# Patient Record
Sex: MALE | Race: WHITE
[De-identification: names, ages, dates, MRNs, and addresses within clinical notes are randomized per-mention and may not be internally consistent; named-entity substitution may affect disease eponyms.]

---

## 2018-05-14 ENCOUNTER — TELEPHONE (OUTPATIENT)
Dept: CASE MANAGEMENT | Age: 53
End: 2018-05-14

## 2018-05-14 ENCOUNTER — HOSPITAL ENCOUNTER (OUTPATIENT)
Dept: VASCULAR LAB | Age: 53
Discharge: HOME | End: 2018-05-14
Attending: NURSE PRACTITIONER | Admitting: NURSE PRACTITIONER

## 2018-05-14 DIAGNOSIS — I83.90 ASYMPTOMATIC VARICOSE VEINS OF LOWER EXTREMITY: ICD-10-CM

## 2023-01-31 ENCOUNTER — ANESTHESIA EVENT (OUTPATIENT)
Dept: OPERATING ROOM | Age: 58
End: 2023-01-31

## 2023-01-31 ENCOUNTER — HOSPITAL ENCOUNTER (OUTPATIENT)
Age: 58
Discharge: HOME | End: 2023-02-01
Attending: EMERGENCY MEDICINE | Admitting: SURGERY

## 2023-01-31 ENCOUNTER — ANESTHESIA (OUTPATIENT)
Dept: OPERATING ROOM | Age: 58
End: 2023-01-31

## 2023-01-31 ENCOUNTER — APPOINTMENT (OUTPATIENT)
Dept: CT IMAGING | Age: 58
End: 2023-01-31

## 2023-01-31 DIAGNOSIS — K80.20 GALL STONES: ICD-10-CM

## 2023-01-31 DIAGNOSIS — K35.80 ACUTE APPENDICITIS, UNSPECIFIED ACUTE APPENDICITIS TYPE: ICD-10-CM

## 2023-01-31 DIAGNOSIS — K57.90 DIVERTICULOSIS: ICD-10-CM

## 2023-01-31 DIAGNOSIS — K35.890 OTHER ACUTE APPENDICITIS: Primary | ICD-10-CM

## 2023-01-31 DIAGNOSIS — R03.0 ELEVATED BLOOD PRESSURE READING: ICD-10-CM

## 2023-01-31 PROBLEM — K35.30 ACUTE APPENDICITIS WITH LOCALIZED PERITONITIS, WITHOUT PERFORATION, ABSCESS, OR GANGRENE: Status: ACTIVE | Noted: 2023-01-31

## 2023-01-31 LAB
A/G RATIO: 1.5 (ref 1.1–2.2)
ALBUMIN SERPL-MCNC: 4.2 G/DL (ref 3.4–5)
ALP BLD-CCNC: 109 U/L (ref 40–129)
ALT SERPL-CCNC: 19 U/L (ref 10–40)
ANION GAP SERPL CALCULATED.3IONS-SCNC: 11 MMOL/L (ref 3–16)
AST SERPL-CCNC: 22 U/L (ref 15–37)
BASOPHILS ABSOLUTE: 0.1 K/UL (ref 0–0.2)
BASOPHILS RELATIVE PERCENT: 0.8 %
BILIRUB SERPL-MCNC: 1 MG/DL (ref 0–1)
BILIRUBIN URINE: NEGATIVE
BLOOD, URINE: NEGATIVE
BUN BLDV-MCNC: 12 MG/DL (ref 7–20)
CALCIUM SERPL-MCNC: 8.5 MG/DL (ref 8.3–10.6)
CHLORIDE BLD-SCNC: 100 MMOL/L (ref 99–110)
CLARITY: CLEAR
CO2: 27 MMOL/L (ref 21–32)
COLOR: YELLOW
CREAT SERPL-MCNC: 0.9 MG/DL (ref 0.9–1.3)
EOSINOPHILS ABSOLUTE: 0.2 K/UL (ref 0–0.6)
EOSINOPHILS RELATIVE PERCENT: 1.8 %
GFR SERPL CREATININE-BSD FRML MDRD: >60 ML/MIN/{1.73_M2}
GLUCOSE BLD-MCNC: 107 MG/DL (ref 70–99)
GLUCOSE URINE: NEGATIVE MG/DL
HCT VFR BLD CALC: 46.5 % (ref 40.5–52.5)
HEMOGLOBIN: 15.9 G/DL (ref 13.5–17.5)
INFLUENZA A: NOT DETECTED
INFLUENZA B: NOT DETECTED
KETONES, URINE: NEGATIVE MG/DL
LEUKOCYTE ESTERASE, URINE: NEGATIVE
LYMPHOCYTES ABSOLUTE: 2 K/UL (ref 1–5.1)
LYMPHOCYTES RELATIVE PERCENT: 19.7 %
MCH RBC QN AUTO: 33.3 PG (ref 26–34)
MCHC RBC AUTO-ENTMCNC: 34.2 G/DL (ref 31–36)
MCV RBC AUTO: 97.6 FL (ref 80–100)
MICROSCOPIC EXAMINATION: NORMAL
MONOCYTES ABSOLUTE: 1.1 K/UL (ref 0–1.3)
MONOCYTES RELATIVE PERCENT: 11 %
NEUTROPHILS ABSOLUTE: 6.8 K/UL (ref 1.7–7.7)
NEUTROPHILS RELATIVE PERCENT: 66.7 %
NITRITE, URINE: NEGATIVE
PDW BLD-RTO: 13.7 % (ref 12.4–15.4)
PH UA: 6 (ref 5–8)
PLATELET # BLD: 160 K/UL (ref 135–450)
PMV BLD AUTO: 9.3 FL (ref 5–10.5)
POTASSIUM REFLEX MAGNESIUM: 3.7 MMOL/L (ref 3.5–5.1)
PROTEIN UA: NEGATIVE MG/DL
RBC # BLD: 4.77 M/UL (ref 4.2–5.9)
SARS-COV-2 RNA, RT PCR: NOT DETECTED
SODIUM BLD-SCNC: 138 MMOL/L (ref 136–145)
SPECIFIC GRAVITY UA: 1.01 (ref 1–1.03)
TOTAL PROTEIN: 7 G/DL (ref 6.4–8.2)
URINE REFLEX TO CULTURE: NORMAL
URINE TYPE: NORMAL
UROBILINOGEN, URINE: 0.2 E.U./DL
WBC # BLD: 10.3 K/UL (ref 4–11)

## 2023-01-31 PROCEDURE — 87636 SARSCOV2 & INF A&B AMP PRB: CPT

## 2023-01-31 PROCEDURE — 3700000001 HC ADD 15 MINUTES (ANESTHESIA): Performed by: SURGERY

## 2023-01-31 PROCEDURE — 2709999900 HC NON-CHARGEABLE SUPPLY: Performed by: SURGERY

## 2023-01-31 PROCEDURE — 88304 TISSUE EXAM BY PATHOLOGIST: CPT

## 2023-01-31 PROCEDURE — 2500000003 HC RX 250 WO HCPCS: Performed by: SURGERY

## 2023-01-31 PROCEDURE — 7100000001 HC PACU RECOVERY - ADDTL 15 MIN: Performed by: SURGERY

## 2023-01-31 PROCEDURE — 6360000004 HC RX CONTRAST MEDICATION: Performed by: EMERGENCY MEDICINE

## 2023-01-31 PROCEDURE — 85025 COMPLETE CBC W/AUTO DIFF WBC: CPT

## 2023-01-31 PROCEDURE — 2580000003 HC RX 258: Performed by: EMERGENCY MEDICINE

## 2023-01-31 PROCEDURE — 6360000002 HC RX W HCPCS: Performed by: EMERGENCY MEDICINE

## 2023-01-31 PROCEDURE — 6360000002 HC RX W HCPCS: Performed by: ANESTHESIOLOGY

## 2023-01-31 PROCEDURE — 44970 LAPAROSCOPY APPENDECTOMY: CPT | Performed by: SURGERY

## 2023-01-31 PROCEDURE — 7100000000 HC PACU RECOVERY - FIRST 15 MIN: Performed by: SURGERY

## 2023-01-31 PROCEDURE — 3600000014 HC SURGERY LEVEL 4 ADDTL 15MIN: Performed by: SURGERY

## 2023-01-31 PROCEDURE — 80053 COMPREHEN METABOLIC PANEL: CPT

## 2023-01-31 PROCEDURE — 2580000003 HC RX 258: Performed by: ANESTHESIOLOGY

## 2023-01-31 PROCEDURE — 81003 URINALYSIS AUTO W/O SCOPE: CPT

## 2023-01-31 PROCEDURE — 2720000010 HC SURG SUPPLY STERILE: Performed by: SURGERY

## 2023-01-31 PROCEDURE — 36415 COLL VENOUS BLD VENIPUNCTURE: CPT

## 2023-01-31 PROCEDURE — 99285 EMERGENCY DEPT VISIT HI MDM: CPT

## 2023-01-31 PROCEDURE — 3700000000 HC ANESTHESIA ATTENDED CARE: Performed by: SURGERY

## 2023-01-31 PROCEDURE — 74177 CT ABD & PELVIS W/CONTRAST: CPT

## 2023-01-31 PROCEDURE — 2500000003 HC RX 250 WO HCPCS: Performed by: ANESTHESIOLOGY

## 2023-01-31 PROCEDURE — 96365 THER/PROPH/DIAG IV INF INIT: CPT

## 2023-01-31 PROCEDURE — 3600000004 HC SURGERY LEVEL 4 BASE: Performed by: SURGERY

## 2023-01-31 PROCEDURE — 94150 VITAL CAPACITY TEST: CPT

## 2023-01-31 RX ORDER — FENTANYL CITRATE 50 UG/ML
INJECTION, SOLUTION INTRAMUSCULAR; INTRAVENOUS PRN
Status: DISCONTINUED | OUTPATIENT
Start: 2023-01-31 | End: 2023-01-31 | Stop reason: SDUPTHER

## 2023-01-31 RX ORDER — SODIUM CHLORIDE 0.9 % (FLUSH) 0.9 %
5-40 SYRINGE (ML) INJECTION EVERY 12 HOURS SCHEDULED
Status: DISCONTINUED | OUTPATIENT
Start: 2023-01-31 | End: 2023-02-01 | Stop reason: HOSPADM

## 2023-01-31 RX ORDER — DIPHENHYDRAMINE HYDROCHLORIDE 50 MG/ML
12.5 INJECTION INTRAMUSCULAR; INTRAVENOUS
Status: DISCONTINUED | OUTPATIENT
Start: 2023-01-31 | End: 2023-01-31 | Stop reason: HOSPADM

## 2023-01-31 RX ORDER — SODIUM CHLORIDE 9 MG/ML
INJECTION, SOLUTION INTRAVENOUS PRN
Status: DISCONTINUED | OUTPATIENT
Start: 2023-01-31 | End: 2023-02-01 | Stop reason: HOSPADM

## 2023-01-31 RX ORDER — LABETALOL HYDROCHLORIDE 5 MG/ML
5 INJECTION, SOLUTION INTRAVENOUS EVERY 10 MIN PRN
Status: DISCONTINUED | OUTPATIENT
Start: 2023-01-31 | End: 2023-01-31 | Stop reason: HOSPADM

## 2023-01-31 RX ORDER — SODIUM CHLORIDE 0.9 % (FLUSH) 0.9 %
5-40 SYRINGE (ML) INJECTION PRN
Status: DISCONTINUED | OUTPATIENT
Start: 2023-01-31 | End: 2023-02-01 | Stop reason: HOSPADM

## 2023-01-31 RX ORDER — OXYCODONE HYDROCHLORIDE 5 MG/1
10 TABLET ORAL PRN
Status: DISCONTINUED | OUTPATIENT
Start: 2023-01-31 | End: 2023-01-31 | Stop reason: HOSPADM

## 2023-01-31 RX ORDER — ATENOLOL 50 MG/1
50 TABLET ORAL DAILY
Status: DISCONTINUED | OUTPATIENT
Start: 2023-02-01 | End: 2023-02-01 | Stop reason: HOSPADM

## 2023-01-31 RX ORDER — ONDANSETRON 2 MG/ML
4 INJECTION INTRAMUSCULAR; INTRAVENOUS EVERY 6 HOURS PRN
Status: DISCONTINUED | OUTPATIENT
Start: 2023-01-31 | End: 2023-02-01 | Stop reason: HOSPADM

## 2023-01-31 RX ORDER — ONDANSETRON 2 MG/ML
INJECTION INTRAMUSCULAR; INTRAVENOUS PRN
Status: DISCONTINUED | OUTPATIENT
Start: 2023-01-31 | End: 2023-01-31 | Stop reason: SDUPTHER

## 2023-01-31 RX ORDER — BUPIVACAINE HYDROCHLORIDE 5 MG/ML
INJECTION, SOLUTION EPIDURAL; INTRACAUDAL PRN
Status: DISCONTINUED | OUTPATIENT
Start: 2023-01-31 | End: 2023-01-31 | Stop reason: ALTCHOICE

## 2023-01-31 RX ORDER — OXYCODONE HYDROCHLORIDE 5 MG/1
5 TABLET ORAL PRN
Status: DISCONTINUED | OUTPATIENT
Start: 2023-01-31 | End: 2023-01-31 | Stop reason: HOSPADM

## 2023-01-31 RX ORDER — ONDANSETRON 2 MG/ML
4 INJECTION INTRAMUSCULAR; INTRAVENOUS
Status: DISCONTINUED | OUTPATIENT
Start: 2023-01-31 | End: 2023-01-31 | Stop reason: HOSPADM

## 2023-01-31 RX ORDER — HYDRALAZINE HYDROCHLORIDE 20 MG/ML
INJECTION INTRAMUSCULAR; INTRAVENOUS PRN
Status: DISCONTINUED | OUTPATIENT
Start: 2023-01-31 | End: 2023-01-31 | Stop reason: SDUPTHER

## 2023-01-31 RX ORDER — SODIUM CHLORIDE 9 MG/ML
INJECTION, SOLUTION INTRAVENOUS PRN
Status: DISCONTINUED | OUTPATIENT
Start: 2023-01-31 | End: 2023-01-31 | Stop reason: HOSPADM

## 2023-01-31 RX ORDER — SODIUM CHLORIDE, SODIUM LACTATE, POTASSIUM CHLORIDE, CALCIUM CHLORIDE 600; 310; 30; 20 MG/100ML; MG/100ML; MG/100ML; MG/100ML
INJECTION, SOLUTION INTRAVENOUS CONTINUOUS PRN
Status: DISCONTINUED | OUTPATIENT
Start: 2023-01-31 | End: 2023-01-31 | Stop reason: SDUPTHER

## 2023-01-31 RX ORDER — SUCCINYLCHOLINE CHLORIDE 20 MG/ML
INJECTION INTRAMUSCULAR; INTRAVENOUS PRN
Status: DISCONTINUED | OUTPATIENT
Start: 2023-01-31 | End: 2023-01-31 | Stop reason: SDUPTHER

## 2023-01-31 RX ORDER — MORPHINE SULFATE 4 MG/ML
4 INJECTION, SOLUTION INTRAMUSCULAR; INTRAVENOUS
Status: DISCONTINUED | OUTPATIENT
Start: 2023-01-31 | End: 2023-02-01 | Stop reason: HOSPADM

## 2023-01-31 RX ORDER — MORPHINE SULFATE 2 MG/ML
2 INJECTION, SOLUTION INTRAMUSCULAR; INTRAVENOUS
Status: DISCONTINUED | OUTPATIENT
Start: 2023-01-31 | End: 2023-02-01 | Stop reason: HOSPADM

## 2023-01-31 RX ORDER — OXYCODONE HYDROCHLORIDE 5 MG/1
10 TABLET ORAL EVERY 4 HOURS PRN
Status: DISCONTINUED | OUTPATIENT
Start: 2023-01-31 | End: 2023-02-01 | Stop reason: HOSPADM

## 2023-01-31 RX ORDER — ROCURONIUM BROMIDE 10 MG/ML
INJECTION, SOLUTION INTRAVENOUS PRN
Status: DISCONTINUED | OUTPATIENT
Start: 2023-01-31 | End: 2023-01-31 | Stop reason: SDUPTHER

## 2023-01-31 RX ORDER — OXYCODONE HYDROCHLORIDE 5 MG/1
5 TABLET ORAL EVERY 4 HOURS PRN
Status: DISCONTINUED | OUTPATIENT
Start: 2023-01-31 | End: 2023-02-01 | Stop reason: HOSPADM

## 2023-01-31 RX ORDER — ONDANSETRON 4 MG/1
4 TABLET, ORALLY DISINTEGRATING ORAL EVERY 8 HOURS PRN
Status: DISCONTINUED | OUTPATIENT
Start: 2023-01-31 | End: 2023-02-01 | Stop reason: HOSPADM

## 2023-01-31 RX ORDER — ACETAMINOPHEN 325 MG/1
650 TABLET ORAL EVERY 4 HOURS PRN
Status: DISCONTINUED | OUTPATIENT
Start: 2023-01-31 | End: 2023-02-01 | Stop reason: HOSPADM

## 2023-01-31 RX ORDER — SODIUM CHLORIDE 0.9 % (FLUSH) 0.9 %
5-40 SYRINGE (ML) INJECTION PRN
Status: DISCONTINUED | OUTPATIENT
Start: 2023-01-31 | End: 2023-01-31 | Stop reason: HOSPADM

## 2023-01-31 RX ORDER — MEPERIDINE HYDROCHLORIDE 25 MG/ML
12.5 INJECTION INTRAMUSCULAR; INTRAVENOUS; SUBCUTANEOUS EVERY 5 MIN PRN
Status: DISCONTINUED | OUTPATIENT
Start: 2023-01-31 | End: 2023-01-31 | Stop reason: HOSPADM

## 2023-01-31 RX ORDER — DEXAMETHASONE SODIUM PHOSPHATE 4 MG/ML
INJECTION, SOLUTION INTRA-ARTICULAR; INTRALESIONAL; INTRAMUSCULAR; INTRAVENOUS; SOFT TISSUE PRN
Status: DISCONTINUED | OUTPATIENT
Start: 2023-01-31 | End: 2023-01-31 | Stop reason: SDUPTHER

## 2023-01-31 RX ORDER — SODIUM CHLORIDE 0.9 % (FLUSH) 0.9 %
5-40 SYRINGE (ML) INJECTION EVERY 12 HOURS SCHEDULED
Status: DISCONTINUED | OUTPATIENT
Start: 2023-01-31 | End: 2023-01-31 | Stop reason: HOSPADM

## 2023-01-31 RX ORDER — PROPOFOL 10 MG/ML
INJECTION, EMULSION INTRAVENOUS PRN
Status: DISCONTINUED | OUTPATIENT
Start: 2023-01-31 | End: 2023-01-31 | Stop reason: SDUPTHER

## 2023-01-31 RX ADMIN — HYDRALAZINE HYDROCHLORIDE 5 MG: 20 INJECTION INTRAMUSCULAR; INTRAVENOUS at 21:16

## 2023-01-31 RX ADMIN — PROPOFOL 200 MG: 10 INJECTION, EMULSION INTRAVENOUS at 21:00

## 2023-01-31 RX ADMIN — ROCURONIUM BROMIDE 30 MG: 10 INJECTION, SOLUTION INTRAVENOUS at 21:00

## 2023-01-31 RX ADMIN — PROPOFOL 40 MG: 10 INJECTION, EMULSION INTRAVENOUS at 21:56

## 2023-01-31 RX ADMIN — PROPOFOL 30 MG: 10 INJECTION, EMULSION INTRAVENOUS at 21:53

## 2023-01-31 RX ADMIN — HYDROMORPHONE HYDROCHLORIDE 0.25 MG: 1 INJECTION, SOLUTION INTRAMUSCULAR; INTRAVENOUS; SUBCUTANEOUS at 22:15

## 2023-01-31 RX ADMIN — SUCCINYLCHOLINE CHLORIDE 100 MG: 20 INJECTION, SOLUTION INTRAMUSCULAR; INTRAVENOUS at 21:00

## 2023-01-31 RX ADMIN — IOPAMIDOL 75 ML: 755 INJECTION, SOLUTION INTRAVENOUS at 16:43

## 2023-01-31 RX ADMIN — HYDROMORPHONE HYDROCHLORIDE 0.5 MG: 1 INJECTION, SOLUTION INTRAMUSCULAR; INTRAVENOUS; SUBCUTANEOUS at 22:41

## 2023-01-31 RX ADMIN — PROPOFOL 30 MG: 10 INJECTION, EMULSION INTRAVENOUS at 21:52

## 2023-01-31 RX ADMIN — MEROPENEM 1000 MG: 1 INJECTION, POWDER, FOR SOLUTION INTRAVENOUS at 19:25

## 2023-01-31 RX ADMIN — SODIUM CHLORIDE, POTASSIUM CHLORIDE, SODIUM LACTATE AND CALCIUM CHLORIDE: 600; 310; 30; 20 INJECTION, SOLUTION INTRAVENOUS at 21:00

## 2023-01-31 RX ADMIN — ROCURONIUM BROMIDE 10 MG: 10 INJECTION, SOLUTION INTRAVENOUS at 21:35

## 2023-01-31 RX ADMIN — FENTANYL CITRATE 50 MCG: 50 INJECTION, SOLUTION INTRAMUSCULAR; INTRAVENOUS at 22:12

## 2023-01-31 RX ADMIN — DEXAMETHASONE SODIUM PHOSPHATE 8 MG: 4 INJECTION, SOLUTION INTRAMUSCULAR; INTRAVENOUS at 21:00

## 2023-01-31 RX ADMIN — FENTANYL CITRATE 50 MCG: 50 INJECTION, SOLUTION INTRAMUSCULAR; INTRAVENOUS at 21:35

## 2023-01-31 RX ADMIN — HYDRALAZINE HYDROCHLORIDE 5 MG: 20 INJECTION INTRAMUSCULAR; INTRAVENOUS at 21:20

## 2023-01-31 RX ADMIN — SUGAMMADEX 200 MG: 100 INJECTION, SOLUTION INTRAVENOUS at 21:59

## 2023-01-31 RX ADMIN — PROPOFOL 40 MG: 10 INJECTION, EMULSION INTRAVENOUS at 21:49

## 2023-01-31 RX ADMIN — FENTANYL CITRATE 150 MCG: 50 INJECTION, SOLUTION INTRAMUSCULAR; INTRAVENOUS at 21:00

## 2023-01-31 RX ADMIN — ONDANSETRON HYDROCHLORIDE 4 MG: 2 INJECTION, SOLUTION INTRAMUSCULAR; INTRAVENOUS at 21:00

## 2023-01-31 ASSESSMENT — PAIN DESCRIPTION - DESCRIPTORS
DESCRIPTORS: DISCOMFORT
DESCRIPTORS: DISCOMFORT

## 2023-01-31 ASSESSMENT — PAIN - FUNCTIONAL ASSESSMENT
PAIN_FUNCTIONAL_ASSESSMENT: 0-10
PAIN_FUNCTIONAL_ASSESSMENT: 0-10

## 2023-01-31 ASSESSMENT — PAIN SCALES - GENERAL
PAINLEVEL_OUTOF10: 4
PAINLEVEL_OUTOF10: 4
PAINLEVEL_OUTOF10: 6
PAINLEVEL_OUTOF10: 7
PAINLEVEL_OUTOF10: 5
PAINLEVEL_OUTOF10: 6

## 2023-01-31 ASSESSMENT — PAIN DESCRIPTION - LOCATION
LOCATION: ABDOMEN
LOCATION: ABDOMEN

## 2023-01-31 NOTE — ED PROVIDER NOTES
[address] ED      CHIEF COMPLAINT  Abdominal Pain (RLQ pain for a few days)       HISTORY OF PRESENT ILLNESS  Brook Bales is a 56 y.o. male  who presents to the ED complaining of right lower quadrant abdominal pain. Has been ongoing for several days and is not associate with any fevers or vomiting. His bowel movements have been slightly discolored but no gross blood or melena. Bowel movements have been slightly more loose than usual.  He has never had any previous abdominal surgeries. He has not noticed any associated hematuria or urinary symptoms. No other complaints, modifying factors or associated symptoms. I have reviewed the following from the nursing documentation. Past Medical History:   Diagnosis Date    Hypertension     Varicose veins      Past Surgical History:   Procedure Laterality Date    VARICOSE VEIN SURGERY       History reviewed. No pertinent family history.   Social History     Socioeconomic History    Marital status:      Spouse name: Not on file    Number of children: Not on file    Years of education: Not on file    Highest education level: Not on file   Occupational History    Not on file   Tobacco Use    Smoking status: Every Day     Packs/day: 1.00     Years: 29.00     Pack years: 29.00     Types: Cigarettes    Smokeless tobacco: Never   Vaping Use    Vaping Use: Never used   Substance and Sexual Activity    Alcohol use: Yes     Comment: 4 times a week    Drug use: No    Sexual activity: Yes     Partners: Female   Other Topics Concern    Not on file   Social History Narrative    Not on file     Social Determinants of Health     Financial Resource Strain: Not on file   Food Insecurity: Not on file   Transportation Needs: Not on file   Physical Activity: Not on file   Stress: Not on file   Social Connections: Not on file   Intimate Partner Violence: Not on file   Housing Stability: Not on file     No current facility-administered medications for this encounter. Current Outpatient Medications   Medication Sig Dispense Refill    hydrALAZINE (APRESOLINE) 25 MG tablet Take 25 mg by mouth 2 times daily      lisinopril (PRINIVIL;ZESTRIL) 10 MG tablet Take 40 mg by mouth daily       atenolol (TENORMIN) 50 MG tablet Take 50 mg by mouth daily. Allergies   Allergen Reactions    Doxycycline Nausea Only       PHYSICAL EXAM  BP (!) 201/111   Pulse 77   Temp 98.2 °F (36.8 °C)   Resp 18   Ht 5' 11\" (1.803 m)   Wt 215 lb (97.5 kg)   SpO2 99%   BMI 29.99 kg/m²    GENERAL APPEARANCE: Awake and alert. Cooperative. No acute distress. HENT: Normocephalic. Atraumatic. Mucous membranes are moist.  No drooling or stridor. NECK: Supple. EYES: EOM's grossly intact. HEART/CHEST: RRR. No murmurs. LUNGS: Respirations unlabored. CTAB. Good air exchange. Speaking comfortably in full sentences. ABDOMEN: Focal right lower quadrant abdominal tenderness. Soft. Non-distended. No masses. No organomegaly. No guarding or rebound. MUSCULOSKELETAL: No extremity edema. Compartments soft. No deformity. No tenderness in the extremities. All extremities neurovascularly intact. SKIN: Warm and dry. No acute rashes. NEUROLOGICAL: Alert and oriented. CN's 2-12 intact. No gross facial drooping. No gross focal deficits  PSYCHIATRIC: Normal mood and affect. LABS  I have reviewed all labs for this visit.    Results for orders placed or performed during the hospital encounter of 01/31/23   CBC with Auto Differential   Result Value Ref Range    WBC 10.3 4.0 - 11.0 K/uL    RBC 4.77 4.20 - 5.90 M/uL    Hemoglobin 15.9 13.5 - 17.5 g/dL    Hematocrit 46.5 40.5 - 52.5 %    MCV 97.6 80.0 - 100.0 fL    MCH 33.3 26.0 - 34.0 pg    MCHC 34.2 31.0 - 36.0 g/dL    RDW 13.7 12.4 - 15.4 %    Platelets 141 607 - 077 K/uL    MPV 9.3 5.0 - 10.5 fL    Neutrophils % 66.7 %    Lymphocytes % 19.7 %    Monocytes % 11.0 %    Eosinophils % 1.8 %    Basophils % 0.8 %    Neutrophils Absolute 6.8 1.7 - 7.7 K/uL    Lymphocytes Absolute 2.0 1.0 - 5.1 K/uL    Monocytes Absolute 1.1 0.0 - 1.3 K/uL    Eosinophils Absolute 0.2 0.0 - 0.6 K/uL    Basophils Absolute 0.1 0.0 - 0.2 K/uL   CMP w/ Reflex to MG   Result Value Ref Range    Sodium 138 136 - 145 mmol/L    Potassium reflex Magnesium 3.7 3.5 - 5.1 mmol/L    Chloride 100 99 - 110 mmol/L    CO2 27 21 - 32 mmol/L    Anion Gap 11 3 - 16    Glucose 107 (H) 70 - 99 mg/dL    BUN 12 7 - 20 mg/dL    Creatinine 0.9 0.9 - 1.3 mg/dL    Est, Glom Filt Rate >60 >60    Calcium 8.5 8.3 - 10.6 mg/dL    Total Protein 7.0 6.4 - 8.2 g/dL    Albumin 4.2 3.4 - 5.0 g/dL    Albumin/Globulin Ratio 1.5 1.1 - 2.2    Total Bilirubin 1.0 0.0 - 1.0 mg/dL    Alkaline Phosphatase 109 40 - 129 U/L    ALT 19 10 - 40 U/L    AST 22 15 - 37 U/L   Urinalysis with Reflex to Culture    Specimen: Urine   Result Value Ref Range    Color, UA Yellow Straw/Yellow    Clarity, UA Clear Clear    Glucose, Ur Negative Negative mg/dL    Bilirubin Urine Negative Negative    Ketones, Urine Negative Negative mg/dL    Specific Gravity, UA 1.010 1.005 - 1.030    Blood, Urine Negative Negative    pH, UA 6.0 5.0 - 8.0    Protein, UA Negative Negative mg/dL    Urobilinogen, Urine 0.2 <2.0 E.U./dL    Nitrite, Urine Negative Negative    Leukocyte Esterase, Urine Negative Negative    Microscopic Examination Not Indicated     Urine Type NotGiven     Urine Reflex to Culture Not Indicated        RADIOLOGY  CT ABDOMEN PELVIS W IV CONTRAST Additional Contrast? None    Result Date: 1/31/2023  EXAMINATION: CT OF THE ABDOMEN AND PELVIS WITH CONTRAST 1/31/2023 4:43 pm TECHNIQUE: CT of the abdomen and pelvis was performed with the administration of intravenous contrast. Multiplanar reformatted images are provided for review. Automated exposure control, iterative reconstruction, and/or weight based adjustment of the mA/kV was utilized to reduce the radiation dose to as low as reasonably achievable. COMPARISON: None.  HISTORY: ORDERING SYSTEM PROVIDED HISTORY: rlq abd pain TECHNOLOGIST PROVIDED HISTORY: Additional Contrast?->None Reason for exam:->rlq abd pain Decision Support Exception - unselect if not a suspected or confirmed emergency medical condition->Emergency Medical Condition (MA) Reason for Exam: rtlq pain FINDINGS: Lower Chest: No acute findings. Organs: The liver, pancreas, spleen, adrenals and kidneys reveal no acute findings. Contracted gallbladder with at least 1 stone identified. Tiny hypoattenuating liver lesions in the right hepatic lobe are too small to characterize, however a benign process is favored such as cysts or hemangiomas. GI/Bowel: Enlarged inflamed appendix measuring 11 mm in the right lower quadrant. No identifiable stone. Surrounding fat stranding and trace fluid is noted. No loculated fluid collection or evidence for gross perforation. No evidence of bowel obstruction. Scattered diverticulosis. Pelvis: No acute findings. The bladder is well distended. Peritoneum/Retroperitoneum: No free air or free fluid. The aorta is normal in caliber. The visceral branches are patent. No lymphadenopathy. Bones/Soft Tissues: No abnormality identified. *Unless otherwise specified, incidental findings do not require dedicated imaging follow-up. 1.  Findings compatible with acute appendicitis. No identifiable stone. No evidence for gross perforation or abscess formation. 2.  Contracted gallbladder with cholelithiasis. 3.  Diverticulosis. ED COURSE/MDM  Patient presenting for evaluation of abdominal pain that is now localizing to the right lower quadrant. He has no leukocytosis or other significant abnormality on his blood work. No evidence of urinary infection or any blood in his urinalysis to indicate a kidney stone as an etiology which was considered. Blood pressure noted to be elevated but no chest pain or evidence of endorgan damage. Will continue to monitor his blood pressure.   Patient declines the need for any pain control at this time. CT scan of the abdomen and pelvis was obtained to evaluate for any possible etiologies such as diverticulitis/colitis or acute appendicitis especially given the location in the right lower quadrant, acute appendicitis was high in the differential.  CT scan did reveal findings compatible with acute appendicitis without any evidence of perforation or abscess. General surgery was consulted and I spoke with Dr. Theo Lee who states that he plans to take the patient to the OR later tonight. IV Merrem given for antibiotic coverage. Patient was updated on plan of care and all questions answered at time of admission. Sepsis:  Is this patient to be included in the SEP-1 Core Measure due to severe sepsis or septic shock? No   Exclusion criteria - the patient is NOT to be included for SEP-1 Core Measure due to:  2+ SIRS criteria are not met       Labs and imaging reviewed and results discussed with patient. Patient was reassessed as noted above . Plan of care discussed with patient. Patient in agreement with plan. I, Dr. Jaylyn Valdes MD, am the primary clinician of record. During the patient's ED course, the patient was given:  Medications   iopamidol (ISOVUE-370) 76 % injection 75 mL (75 mLs IntraVENous Given 1/31/23 1643)   meropenem (MERREM) 1,000 mg in sodium chloride 0.9 % 100 mL IVPB (mini-bag) (0 mg IntraVENous Stopped 1/31/23 2007)        CLINICAL IMPRESSION  1. Other acute appendicitis    2. Elevated blood pressure reading    3. Gall stones    4. Diverticulosis        Blood pressure (!) 201/111, pulse 77, temperature 98.2 °F (36.8 °C), resp. rate 18, height 5' 11\" (1.803 m), weight 215 lb (97.5 kg), SpO2 99 %. [address] was admitted in stable condition. DISCLAIMER: This chart was created using Dragon dictation software.   Efforts were made by me to ensure accuracy, however some errors may be present due to limitations of this technology and occasionally words are not transcribed correctly.         Christofer Bhakta MD  01/31/23 2038

## 2023-02-01 ENCOUNTER — TELEPHONE (OUTPATIENT)
Dept: SURGERY | Age: 58
End: 2023-02-01

## 2023-02-01 VITALS
HEART RATE: 69 BPM | TEMPERATURE: 98.2 F | SYSTOLIC BLOOD PRESSURE: 146 MMHG | RESPIRATION RATE: 18 BRPM | OXYGEN SATURATION: 95 % | HEIGHT: 71 IN | BODY MASS INDEX: 29.26 KG/M2 | WEIGHT: 209 LBS | DIASTOLIC BLOOD PRESSURE: 77 MMHG

## 2023-02-01 DIAGNOSIS — K35.30 ACUTE APPENDICITIS WITH LOCALIZED PERITONITIS, WITHOUT PERFORATION, ABSCESS, OR GANGRENE: Primary | ICD-10-CM

## 2023-02-01 PROCEDURE — 2580000003 HC RX 258: Performed by: SURGERY

## 2023-02-01 PROCEDURE — 99024 POSTOP FOLLOW-UP VISIT: CPT | Performed by: SURGERY

## 2023-02-01 PROCEDURE — 6370000000 HC RX 637 (ALT 250 FOR IP): Performed by: INTERNAL MEDICINE

## 2023-02-01 RX ORDER — HYDRALAZINE HYDROCHLORIDE 20 MG/ML
10 INJECTION INTRAMUSCULAR; INTRAVENOUS EVERY 6 HOURS PRN
Status: DISCONTINUED | OUTPATIENT
Start: 2023-02-01 | End: 2023-02-01 | Stop reason: HOSPADM

## 2023-02-01 RX ORDER — OXYCODONE HYDROCHLORIDE 5 MG/1
5 TABLET ORAL EVERY 6 HOURS PRN
Qty: 24 TABLET | Refills: 0 | Status: SHIPPED | OUTPATIENT
Start: 2023-02-01 | End: 2023-02-08

## 2023-02-01 RX ORDER — HYDRALAZINE HYDROCHLORIDE 25 MG/1
25 TABLET, FILM COATED ORAL EVERY 12 HOURS SCHEDULED
Status: DISCONTINUED | OUTPATIENT
Start: 2023-02-01 | End: 2023-02-01 | Stop reason: HOSPADM

## 2023-02-01 RX ORDER — LISINOPRIL 20 MG/1
20 TABLET ORAL ONCE
Status: COMPLETED | OUTPATIENT
Start: 2023-02-01 | End: 2023-02-01

## 2023-02-01 RX ORDER — ATENOLOL 25 MG/1
25 TABLET ORAL ONCE
Status: COMPLETED | OUTPATIENT
Start: 2023-02-01 | End: 2023-02-01

## 2023-02-01 RX ORDER — LISINOPRIL 20 MG/1
40 TABLET ORAL DAILY
Status: DISCONTINUED | OUTPATIENT
Start: 2023-02-02 | End: 2023-02-01 | Stop reason: HOSPADM

## 2023-02-01 RX ADMIN — LISINOPRIL 20 MG: 20 TABLET ORAL at 00:53

## 2023-02-01 RX ADMIN — HYDRALAZINE HYDROCHLORIDE 25 MG: 25 TABLET, FILM COATED ORAL at 00:53

## 2023-02-01 RX ADMIN — ATENOLOL 25 MG: 25 TABLET ORAL at 00:53

## 2023-02-01 RX ADMIN — SODIUM CHLORIDE, PRESERVATIVE FREE 10 ML: 5 INJECTION INTRAVENOUS at 00:05

## 2023-02-01 ASSESSMENT — PAIN SCALES - GENERAL: PAINLEVEL_OUTOF10: 3

## 2023-02-01 NOTE — PROGRESS NOTES
Patient is able to demonstrate the ability to move from a reclining position to an upright position within the recliner. 4 Eyes Skin Assessment     The patient is being assess for   Admission    I agree that 2 RN's have performed a thorough Head to Toe Skin Assessment on the patient. ALL assessment sites listed below have been assessed. Areas assessed for pressure by both nurses:   [x]   Head, Face, and Ears   [x]   Shoulders, Back, and Chest, Abdomen  [x]   Arms, Elbows, and Hands   [x]   Coccyx, Sacrum, and Ischium  [x]   Legs, Feet, and Heels        Skin Assessed Under all Medical Devices by both nurses:  na               All Mepilex Borders were peeled back and area peeked at by both nurses:  No: na  Please list where Mepilex Borders are located:  na    Healing scabs to right shin, not open or draining. Busted veins on R ankle, pt states it has been this way for years. No redness r/t pressure or skin breakdown. **SHARE this note so that the co-signing nurse is able to place an eSignature**    Co-signer eSignature: Electronically signed by Imelda Lyle RN on 1/31/23 at 11:54 PM EST    Does the Patient have Skin Breakdown related to pressure?   No     (Insert Photo here na)         Arthur Prevention initiated:  NA   Wound Care Orders initiated:  NA      [facility] nurse consulted for Pressure Injury (Stage 3,4, Unstageable, DTI, NWPT, Complex wounds)and New or Established Ostomies:  NA      Primary Nurse eSignature: Electronically signed by Carlos Delacruz RN on 1/31/23 at 11:53 PM EST

## 2023-02-01 NOTE — BRIEF OP NOTE
Brief Postoperative Note      Patient: Osmin Vanegas  YOB: 1965  MRN: [id number]    Date of Procedure: 1/31/2023    Pre-Op Diagnosis: Acute appendicitis    Post-Op Diagnosis: Same       Procedure(s):  APPENDECTOMY LAPAROSCOPIC    Surgeon(s):  Ros Aguilar MD    Assistant:  Surgical Assistant: Abelina Sandifer    Anesthesia: General    Estimated Blood Loss (mL): less than 50     Complications: None    Specimens:   ID Type Source Tests Collected by Time Destination   A : appendix Tissue Tissue SURGICAL PATHOLOGY Ros Aguilar MD 1/31/2023 2138        Implants:  * No implants in log *      Drains: * No LDAs found *    Findings: acute suppurative, non-perforated appendicitis                   Incidental Meckel's diverticulum    Job#: [id number]    Electronically signed by Porsha Hooper MD on 2/2/2023 at 10:34 PM

## 2023-02-01 NOTE — FLOWSHEET NOTE
01/31/23 5468   Pain Assessment   Pain Assessment 0-10   Pain Level 4   Patient's Stated Pain Goal 4   Response to Pain Intervention Patient satisfied    Pt declining pain meds rating 4/10, states \"it's doable, I'm good\"

## 2023-02-01 NOTE — PROGRESS NOTES
Spoke with Dr. Winifred Luna regarding /99; Dr. Link Caraballo said he spoke with Dr. Winifred Luna as well, and Dr. Link Caraballo ordered BP meds scheduled and PRN for tonight.

## 2023-02-01 NOTE — DISCHARGE INSTRUCTIONS
[facility] AND [facility]. Celestina Christina M.D. [address]                [address] Dee Dunaway M.D. [address] [facility], [address] [place] [address]         [place], [address] Gilda Padron M.D                         [phone]          [facility] Ivan Berg M.D. [facility]                                                                       POST-OPERATIVE INSTRUCTIONS FOR APPENDECTOMY    Call the office to schedule your post-operative appointment with your surgeon for two (2) weeks. You will have either white steri-strips and a water occlusive dressing or surgical glue closing your incisions. If you have clear bandages over your incisions, you may remove them in 2 days. Leave the steri-stips in place. These will peel away in 7-10 days. You may shower after removing your dressing 2 days after surgery. Wash incisions gently, and pat them dry. Do not rub your incisions. General guidelines for activity:  Avoid strenuous activity or lifting anything heavier than 15 pounds. It is OK to be up walking around; walking up and down stairs is also OK. Do what is comfortable: stop and rest when you feel tired. Drink plenty of fluids and stay on a bland diet for 2-3 days after surgery. Do NOT drive while taking your narcotic pain medicine. You can resume driving when you feel capable of responding to urgent situation if needed and not taking prescription pain medication. Watch for signs of infection:   Fever over 100.5°   Excessive warmth or bright redness around your incisions  Leakage of bloody or cloudy fluid from you incisions    During the laparoscopic procedure that you had, gas is pumped into the abdominal cavity. You may feel abdominal, shoulder, or rib pain for a few days due to this. You will have pain medicine ordered. Take as directed. If you experience constipation  Increase your water intake, and activity; walking is best.  A stool softener or mild laxative may be necessary if you still have not had a bowel  movement ; call the office for further instructions.

## 2023-02-01 NOTE — H&P
Department of General Surgery - Adult   History and Physical      PATIENT NAME: Latonia Meek OF BIRTH: 1965    ADMISSION DATE: 1/31/2023  3:31 PM      TODAY'S DATE: 1/31/2023    CHIEF COMPLAINT:  RLQ abdominal pain      HISTORY OF PRESENT ILLNESS:  The patient is a 56 y.o. male  who presents with 2-3 days of lower abdominal pain which has localized to RLQ. Denies fever, chills, nausea, vomiting. No prior h/o similar sx. Seen in ED, workup with CT shows acute uncomplicated appendicitis    Past Medical History:        Diagnosis Date    Hypertension     Varicose veins        Past Surgical History:        Procedure Laterality Date    VARICOSE VEIN SURGERY         Medications Prior to Admission:   Prior to Admission medications    Medication Sig Start Date End Date Taking? Authorizing Provider   hydrALAZINE (APRESOLINE) 25 MG tablet Take 25 mg by mouth 2 times daily    Historical Provider, MD   lisinopril (PRINIVIL;ZESTRIL) 10 MG tablet Take 40 mg by mouth daily     Historical Provider, MD   atenolol (TENORMIN) 50 MG tablet Take 50 mg by mouth daily. Historical Provider, MD       Allergies:  Doxycycline    Social History:   TOBACCO:   reports that he has been smoking cigarettes. He has a 29.00 pack-year smoking history. He has never used smokeless tobacco.  ETOH:   reports current alcohol use. DRUGS:   reports no history of drug use. MARITAL STATUS:    OCCUPATION:  @[employer]@  Patient currently lives with family      Family History:   History reviewed. No pertinent family history. REVIEW OF SYSTEMS:    CONSTITUTIONAL:  positive for  anorexia  HEENT:  Negative  RESPIRATORY:  negative  CARDIOVASCULAR:  negative  GASTROINTESTINAL:  positive for abdominal pain  GENITOURINARY:  negative  HEMATOLOGIC/LYMPHATIC:  negative  ENDOCRINE:  Negative  NEUROLOGICAL:  Negative  * All other ROS reviewed and negative.      PHYSICAL EXAM:    VITALS:  BP (!) 201/111   Pulse 77   Temp 98.2 °F (36.8 °C)   Resp 18   Ht 5' 11\" (1.803 m)   Wt 215 lb (97.5 kg)   SpO2 99%   BMI 29.99 kg/m²   INTAKE/OUTPUT:   No intake/output data recorded. I/O this shift:  In: 1000 [IV Piggyback:1000]  Out: -       CONSTITUTIONAL:  alert, mild distress and normal weight  EYES:  PERRL, sclera clear  ENT:  Normocephalic,atraumatic, without obvious abnormality  NECK:  supple, symmetrical, trachea midline  LUNGS: Resp effort easy and unlabored,    CARDIOVASCULAR:  NO JVD, regular rate and rhythm and    ABDOMEN:  no scars,  , soft, non-distended, tenderness noted in the right lower quadrant, involuntary guarding absent, no masses palpated and    MUSCULOSKELETAL: No clubbing or cyanosis, 0+ pitting edema lower extremities  NEUROLOGIC:  Mental Status Exam:  Level of Alertness:   awake  PSYCHIATRIC:   person, place, time  SKIN:  normal skin color, texture, turgor      DATA:  CBC:   Recent Labs     01/31/23  1527   WBC 10.3   HGB 15.9   HCT 46.5        BMP:    Recent Labs     01/31/23  1527      K 3.7      CO2 27   BUN 12   CREATININE 0.9   GLUCOSE 107*     Hepatic:   Recent Labs     01/31/23  1527   AST 22   ALT 19   BILITOT 1.0   ALKPHOS 109     Mag:    No results for input(s): MG in the last 72 hours. Phos:   No results for input(s): PHOS in the last 72 hours. INR: No results for input(s): INR in the last 72 hours. Radiology Review: Images personally reviewed by me. CT OF THE ABDOMEN AND PELVIS WITH CONTRAST 1/31/2023 4:43 pm       TECHNIQUE:   CT of the abdomen and pelvis was performed with the administration of   intravenous contrast. Multiplanar reformatted images are provided for review. Automated exposure control, iterative reconstruction, and/or weight based   adjustment of the mA/kV was utilized to reduce the radiation dose to as low   as reasonably achievable. COMPARISON:   None.        HISTORY:   ORDERING SYSTEM PROVIDED HISTORY: rlq abd pain   TECHNOLOGIST PROVIDED HISTORY: Additional Contrast?->None   Reason for exam:->rlq abd pain   Decision Support Exception - unselect if not a suspected or confirmed   emergency medical condition->Emergency Medical Condition (MA)   Reason for Exam: rtlq pain       FINDINGS:   Lower Chest: No acute findings. Organs: The liver, pancreas, spleen, adrenals and kidneys reveal no acute   findings. Contracted gallbladder with at least 1 stone identified. Tiny   hypoattenuating liver lesions in the right hepatic lobe are too small to   characterize, however a benign process is favored such as cysts or   hemangiomas. GI/Bowel: Enlarged inflamed appendix measuring 11 mm in the right lower   quadrant. No identifiable stone. Surrounding fat stranding and trace fluid   is noted. No loculated fluid collection or evidence for gross perforation. No evidence of bowel obstruction. Scattered diverticulosis. Pelvis: No acute findings. The bladder is well distended. Peritoneum/Retroperitoneum: No free air or free fluid. The aorta is normal   in caliber. The visceral branches are patent. No lymphadenopathy. Bones/Soft Tissues: No abnormality identified. *Unless otherwise specified, incidental findings do not require dedicated   imaging follow-up. Impression   1. Findings compatible with acute appendicitis. No identifiable stone. No   evidence for gross perforation or abscess formation. 2.  Contracted gallbladder with cholelithiasis. 3.  Diverticulosis. ASSESSMENT AND PLAN:  Acute Appendicitis: pt's history, physical and radiographic findings are most consistent with acute appendicitis. The pt will go to OR for laparoscopic appendectomy with possible open procedure. Risk, benefits and alternatives of procedure have been discussed with patient and/or family and they seem to understand and agree to proceed. Pt understands and agrees to proceed.  Appropriate pre-op antibiotics have been ordered. PRACTITIONER CERTIFICATION   I certify that Milagros Bess is expected to be hospitalized for <2 days based on the above assessment and plan.       Electronically signed by Abdi Leija MD     [id number]

## 2023-02-01 NOTE — ANESTHESIA PRE PROCEDURE
Department of Anesthesiology  Preprocedure Note       Name:  Jake Alvarez   Age:  56 y.o.  :  1965                                          MRN:  [id number]         Date:  2023      Surgeon: Karen Montes):  Baldemar Álvarez MD    Procedure: Procedure(s):  APPENDECTOMY LAPAROSCOPIC    Medications prior to admission:   Prior to Admission medications    Medication Sig Start Date End Date Taking? Authorizing Provider   hydrALAZINE (APRESOLINE) 25 MG tablet Take 25 mg by mouth 2 times daily    Historical Provider, MD   lisinopril (PRINIVIL;ZESTRIL) 10 MG tablet Take 40 mg by mouth daily     Historical Provider, MD   atenolol (TENORMIN) 50 MG tablet Take 50 mg by mouth daily. Historical Provider, MD       Current medications:    No current facility-administered medications for this encounter. Current Outpatient Medications   Medication Sig Dispense Refill    hydrALAZINE (APRESOLINE) 25 MG tablet Take 25 mg by mouth 2 times daily      lisinopril (PRINIVIL;ZESTRIL) 10 MG tablet Take 40 mg by mouth daily       atenolol (TENORMIN) 50 MG tablet Take 50 mg by mouth daily. Allergies:     Allergies   Allergen Reactions    Doxycycline Nausea Only       Problem List:    Patient Active Problem List   Diagnosis Code    Acute appendicitis with localized peritonitis, without perforation, abscess, or gangrene K35.30       Past Medical History:        Diagnosis Date    Hypertension     Varicose veins        Past Surgical History:        Procedure Laterality Date    VARICOSE VEIN SURGERY         Social History:    Social History     Tobacco Use    Smoking status: Every Day     Packs/day: 1.00     Years: 29.00     Pack years: 29.00     Types: Cigarettes    Smokeless tobacco: Never   Substance Use Topics    Alcohol use: Yes     Comment: 4 times a week                                Ready to quit: Not Answered  Counseling given: Not Answered      Vital Signs (Current):   Vitals:    23 1523 01/31/23 2012 01/31/23 2023   BP: (!) 167/100 (!) 214/101 (!) 201/111   Pulse: 72 77    Resp: 16 18    Temp: 98.2 °F (36.8 °C)     SpO2: 99% 99%    Weight: 215 lb (97.5 kg)     Height: 5' 11\" (1.803 m)                                                BP Readings from Last 3 Encounters:   01/31/23 (!) 201/111   05/13/18 (!) 148/82   01/23/16 (!) 182/91       NPO Status:                                                                                 BMI:   Wt Readings from Last 3 Encounters:   01/31/23 215 lb (97.5 kg)   05/13/18 208 lb (94.3 kg)   01/23/16 205 lb (93 kg)     Body mass index is 29.99 kg/m².    CBC:   Lab Results   Component Value Date/Time    WBC 10.3 01/31/2023 03:27 PM    RBC 4.77 01/31/2023 03:27 PM    HGB 15.9 01/31/2023 03:27 PM    HCT 46.5 01/31/2023 03:27 PM    MCV 97.6 01/31/2023 03:27 PM    RDW 13.7 01/31/2023 03:27 PM     01/31/2023 03:27 PM       CMP:   Lab Results   Component Value Date/Time     01/31/2023 03:27 PM    K 3.7 01/31/2023 03:27 PM     01/31/2023 03:27 PM    CO2 27 01/31/2023 03:27 PM    BUN 12 01/31/2023 03:27 PM    CREATININE 0.9 01/31/2023 03:27 PM    GFRAA >60 05/13/2018 02:48 PM    GFRAA >60 01/05/2010 11:50 AM    GFRAA >60 01/05/2010 11:50 AM    AGRATIO 1.5 01/31/2023 03:27 PM    LABGLOM >60 01/31/2023 03:27 PM    GLUCOSE 107 01/31/2023 03:27 PM    PROT 7.0 01/31/2023 03:27 PM    PROT 7.4 01/05/2010 11:50 AM    PROT 7.4 01/05/2010 11:50 AM    CALCIUM 8.5 01/31/2023 03:27 PM    BILITOT 1.0 01/31/2023 03:27 PM    ALKPHOS 109 01/31/2023 03:27 PM    AST 22 01/31/2023 03:27 PM    ALT 19 01/31/2023 03:27 PM       POC Tests: No results for input(s): POCGLU, POCNA, POCK, POCCL, POCBUN, POCHEMO, POCHCT in the last 72 hours.    Coags:   Lab Results   Component Value Date/Time    PROTIME 11.7 05/13/2018 02:48 PM    INR 1.04 05/13/2018 02:48 PM       HCG (If Applicable): No results found for: PREGTESTUR, PREGSERUM, HCG, HCGQUANT     ABGs: No results found for: PHART,  PO2ART, YSP0LXU, XMI0IMH, BEART, T6BAPFRM     Type & Screen (If Applicable):  No results found for: LABABO, LABRH    Drug/Infectious Status (If Applicable):  No results found for: HIV, HEPCAB    COVID-19 Screening (If Applicable): No results found for: COVID19        Anesthesia Evaluation  Patient summary reviewed and Nursing notes reviewed no history of anesthetic complications:   Airway: Mallampati: II     Neck ROM: full     Dental:          Pulmonary:Negative Pulmonary ROS and normal exam                               Cardiovascular:Negative CV ROS    (+) hypertension:,                   Neuro/Psych:   Negative Neuro/Psych ROS              GI/Hepatic/Renal: Neg GI/Hepatic/Renal ROS       (-) hiatal hernia and GERD       Endo/Other: Negative Endo/Other ROS                    Abdominal:             Vascular: Other Findings:           Anesthesia Plan      general     ASA 2 - emergent     (I discussed with the patient the risks and benefits of PIV, general anesthesia, IV Narcotics, PACU. All questions were answered the patient agrees with the plan and wishes to proceed.  )  Induction: intravenous. Pre-Operative Diagnosis: Acute appendicitis with localized peritonitis, without perforation, abscess, or gangrene [K35.30]    56 y.o.   BMI:  Body mass index is 29.99 kg/m².      Vitals:    01/31/23 1523 01/31/23 2012 01/31/23 2023   BP: (!) 167/100 (!) 214/101 (!) 201/111   Pulse: 72 77    Resp: 16 18    Temp: 98.2 °F (36.8 °C)     SpO2: 99% 99%    Weight: 215 lb (97.5 kg)     Height: 5' 11\" (1.803 m)         Allergies   Allergen Reactions    Doxycycline Nausea Only       Social History     Tobacco Use    Smoking status: Every Day     Packs/day: 1.00     Years: 29.00     Pack years: 29.00     Types: Cigarettes    Smokeless tobacco: Never   Substance Use Topics    Alcohol use: Yes     Comment: 4 times a week       LABS:    CBC  Lab Results   Component Value Date/Time    WBC 10.3 01/31/2023 03:27 PM    HGB 15.9 01/31/2023 03:27 PM    HCT 46.5 01/31/2023 03:27 PM     01/31/2023 03:27 PM     RENAL  Lab Results   Component Value Date/Time     01/31/2023 03:27 PM    K 3.7 01/31/2023 03:27 PM     01/31/2023 03:27 PM    CO2 27 01/31/2023 03:27 PM    BUN 12 01/31/2023 03:27 PM    CREATININE 0.9 01/31/2023 03:27 PM    GLUCOSE 107 (H) 01/31/2023 03:27 PM     COAGS  Lab Results   Component Value Date/Time    PROTIME 11.7 05/13/2018 02:48 PM    INR 1.04 05/13/2018 02:48 PM         Letha Martinez MD   1/31/2023

## 2023-02-01 NOTE — FLOWSHEET NOTE
01/31/23 2247   Pain Assessment   Pain Assessment 0-10   Pain Level 7   Patient's Stated Pain Goal 4   Pain Location Abdomen   Pain Descriptors Discomfort   Opioid-Induced Sedation   POSS Score 1    Pt states pain 7/10 medicated per order, refer to [facility]

## 2023-02-01 NOTE — PROGRESS NOTES
Transferred care to [facility]. Face to face bedside report given, no need voiced at this time.

## 2023-02-01 NOTE — PROGRESS NOTES
Pt to PACU, placed on bedside monitor. NSR 78. SPO2 94% on RA, resp e/e unlabored. ABD soft, Incisions w/ skin glue x 3 CDI. RN to bedside. Phase I (PACU)  1. Patient is identified using name and the date of birth. 2.  The patient is free from signs and symptoms of chemical, electrical, laser, radiation, positioning, or transfer/transport injury. 3.  The patient receives appropriate medication(s), safely administered during the Perioperative period. 4.  The patient has wound/tissue perfusion consistent with or improved from baseline levels established preoperatively. 5.  The patient is at or returning to normothermia at the conclusion of the immediate postoperative period. 6.  The patient's fluid, electrolyte, and acid base balances are consistent with or improved from baseline levels established preoperatively. 7.  The patient's pulmonary function is consistent with or improved from baseline levels established preoperatively. 8.  The patient's cardiovascular status is consistent with or improved from baseline levels established preoperatively. 9.  The patient/caregiver participates in decisions affecting his or her Perioperative plan of care. 10. The patient's care is consistent with the individualized Perioperative plan of care. 11. The patient's right to privacy is maintained. 12. The patient is the recipient of competent and ethical care within legal standards of practice. 13.  The patient's value system, lifestyle, ethnicity, and culture are considered, respected, and incorporated in the Perioperative plan of care. 14.  The patient demonstrates and/or reports adequate pain control throughout the the Perioperative period. 15. The patient's neurological status is consistent with or improved from baseline levels established preoperatively. 16.  The patient/caregiver demonstrates knowledge of the expected responses to the operative or invasive procedure.   17.  Patient/Caregiver has reduced anxiety. Interventions- Familiarize with environment and equipment.   18.  Other:  19.Other:

## 2023-02-01 NOTE — PLAN OF CARE
Problem: Discharge Planning  Goal: Discharge to home or other facility with appropriate resources  2/1/2023 1017 by Valentin Harper RN  Outcome: Completed  2/1/2023 0237 by Gifty Reina RN  Outcome: Progressing     Problem: Pain  Goal: Verbalizes/displays adequate comfort level or baseline comfort level  2/1/2023 1017 by Valentin Harper RN  Outcome: Completed  2/1/2023 0237 by Gifty Reina RN  Outcome: Progressing

## 2023-02-01 NOTE — CARE COORDINATION
Review of chart screened for potential discharge planning needs. Contact with patient  Role of discharge planner explained with verbalized understanding. No Needs identified by pt/support person for barriers to discharge. Readmission Risk Score:N/A  No discharge needs noted not following. MD and bedside RN please notify CM if needs arise for any discharge interventions. Reviewed chart and met with pt at bedside. Role of CM explained. States lives home with wife and is IPTA with all care. Denies needs. No dc needs identified.

## 2023-02-01 NOTE — DISCHARGE SUMMARY
Surgery Discharge Summary    Patient Identification  Ry Chaney is a 56 y.o. male. :  1965  Admit Date:  2023    Discharge date:   No discharge date for patient encounter. Disposition: home    Discharge Diagnoses:   Patient Active Problem List   Diagnosis    Acute appendicitis with localized peritonitis, without perforation, abscess, or gangrene    Appendicitis, acute         Consults: none    Surgery: Lap appy    Patient Instructions: Activity: no heavy lifting for 2 weeks  Diet: clear liquids, advance as tolerated  Wound Care: as directed    Follow-up with Dr. Rissa Awad in 2 weeks. See pre-printed instructions in chart and given to patient upon discharge. Discharge Medications:        Medication List        START taking these medications      oxyCODONE 5 MG immediate release tablet  Commonly known as: Roxicodone  Take 1 tablet by mouth every 6 hours as needed for Pain for up to 7 days. Intended supply: 7 days. Take lowest dose possible to manage pain Max Daily Amount: 20 mg            CONTINUE taking these medications      atenolol 50 MG tablet  Commonly known as: TENORMIN     hydrALAZINE 25 MG tablet  Commonly known as: APRESOLINE     lisinopril 10 MG tablet  Commonly known as: PRINIVIL;ZESTRIL               Where to Get Your Medications        These medications were sent to [address] [place] [address], [address] [place] [address],[address]  [facility], [address] [place] [address]      Phone: [phone]   oxyCODONE 5 MG immediate release tablet          Condition at discharge: Stable    HPI and Hospital Course: Urgent surgery and uneventful observation.         Pat Pascual MD    [address] Surgery

## 2023-02-01 NOTE — FLOWSHEET NOTE
02/01/23 1013   Vital Signs   Temp 98.2 °F (36.8 °C)   Temp Source Oral   Heart Rate 69   Heart Rate Source Monitor   Resp 18   BP (!) 146/77   MAP (Calculated) 100   BP Location Left upper arm   BP Method Automatic   Patient Position Semi fowlers   Level of Consciousness 0   MEWS Score 1   Oxygen Therapy   SpO2 95 %   O2 Device None (Room air)     Shift assessment complete. See flow sheet. Scheduled meds given. See MAR. Patients head-toe complete, VS are logged, and active bowel sound noted in all four quadrants. Patient sitting up in bed respirations easy and unlabored. No s/s of distress. Denies pain or SOB. Alert and oriented x4. No further needs  noted at this time. Call light and bedside table are within reach. The bed is locked and is in the lowest position.         Kayleen Bryson RN

## 2023-02-01 NOTE — PROGRESS NOTES
IV removed and discharge instructions completed. Discharge instructions and education sent home with patient. All questions were answered to patients satisfaction. Request for transport placed, all personal belongs packed. No further needs noted. Patient left with daughter.

## 2023-02-01 NOTE — PROGRESS NOTES
Bedside report to [facility], POC reviewed. Both RN's assessed pt abd. Pt rates pain 4/10 and states \"I'm much more comfortable now\" denies n/a. Transported to [facility] via stretcher accompanied by floor RN and aide. Family updated. Will transfer care.

## 2023-02-01 NOTE — PROGRESS NOTES
Pt arrived to room 203 at this time. A&Ox4, VSS. HS assessment completed. No complaints of pain or discomfort voiced. No signs of symptoms of distress noted. Patient tolerated night medications well. Respirations easy and even. Bed in lowest position, bed alarm in place and functioning properly, bed rails x2 up,  Call light within reach. Bedside Mobility Assessment Tool (BMAT):     Assessment Level 1- Sit and Shake    1. From a semi-reclined position, ask patient to sit up and rotate to a seated position at the side of the bed. Can use the bedrail. 2. Ask patient to reach out and grab your hand and shake making sure patient reaches across his/her midline. Pass- Patient is able to come to a seated position, maintain core strength. Maintains seated balance while reaching across midline. Move on to Assessment Level 2. Assessment Level 2- Stretch and Point   1. With patient in seated position at the side of the bed, have patient place both feet on the floor (or stool) with knees no higher than hips. 2. Ask patient to stretch one leg and straighten the knee, then bend the ankle/flex and point the toes. If appropriate, repeat with the other leg. Pass- Patient is able to demonstrate appropriate quad strength on intended weight bearing limb(s). Move onto Assessment Level 3. Assessment Level 3- Stand   1. Ask patient to elevate off the bed or chair (seated to standing) using an assistive device (cane, bedrail). 2. Patient should be able to raise buttocks off be and hold for a count of five. May repeat once. Pass- Patient maintains standing stability for at least 5 seconds, proceed to assessment level 4. Assessment Level 4- Walk   1. Ask patient to march in place at bedside. 2. Then ask patient to advance step and return each foot. Some medical conditions may render a patient from stepping backwards, use your best clinical judgement.    Pass- Patient demonstrates balance while shifting weight and ability to step, takes independent steps, does not use assistive device patient is MOBILITY LEVEL 4.       Mobility Level- 4

## 2023-02-01 NOTE — FLOWSHEET NOTE
01/31/23 2359   Vital Signs   Temp 98.1 °F (36.7 °C)   Temp Source Oral   Heart Rate 77   Heart Rate Source Monitor   Resp 16   BP (!) 181/99   MAP (Calculated) 126   BP Location Left upper arm   BP Method Automatic   Patient Position Semi fowlers   Level of Consciousness 0   MEWS Score 1   Oxygen Therapy   SpO2 94 %   O2 Device None (Room air)       MD notified of /99; requested PRN.

## 2023-02-03 NOTE — ANESTHESIA POSTPROCEDURE EVALUATION
Department of Anesthesiology  Postprocedure Note    Patient: Yrn Keys  MRN: [id number]  YOB: 1965  Date of evaluation: 2/3/2023      Procedure Summary     Date: 01/31/23 Room / Location: [place] [address] / [facility]    Anesthesia Start: 2051 Anesthesia Stop: 2212    Procedure: APPENDECTOMY LAPAROSCOPIC (Abdomen) Diagnosis:       Acute appendicitis, unspecified acute appendicitis type      (Acute appendicitis, unspecified acute appendicitis type [K35.80])    Surgeons: Tesha Hoyos MD Responsible Provider: Colin Cruz MD    Anesthesia Type: general ASA Status: 2 - Emergent          Anesthesia Type: No value filed.     Monica Phase I: Monica Score: 10    Monica Phase II:        Anesthesia Post Evaluation    Comments: Postoperative Anesthesia Note    Name:    Yrn Keys  MRN:      [id number]    Patient Vitals in the past 12 hrs:     LABS:    CBC  Lab Results       Component                Value               Date/Time                  WBC                      10.3                01/31/2023 03:27 PM        HGB                      15.9                01/31/2023 03:27 PM        HCT                      46.5                01/31/2023 03:27 PM        PLT                      160                 01/31/2023 03:27 PM   RENAL  Lab Results       Component                Value               Date/Time                  NA                       138                 01/31/2023 03:27 PM        K                        3.7                 01/31/2023 03:27 PM        CL                       100                 01/31/2023 03:27 PM        CO2                      27                  01/31/2023 03:27 PM        BUN                      12                  01/31/2023 03:27 PM        CREATININE               0.9                 01/31/2023 03:27 PM        GLUCOSE                  107 (H)             01/31/2023 03:27 PM   COAGS  Lab Results       Component                Value Date/Time                  PROTIME                  11.7                05/13/2018 02:48 PM        INR                      1.04                05/13/2018 02:48 PM     Intake & Output:  @28IJBM@    Nausea & Vomiting:  No    Level of Consciousness:  Awake    Pain Assessment:  Adequate analgesia    Anesthesia Complications:  No apparent anesthetic complications    SUMMARY      Vital signs stable  OK to discharge from Stage I post anesthesia care.   Care transferred from Anesthesiology department on discharge from perioperative area

## 2023-02-07 ENCOUNTER — TELEPHONE (OUTPATIENT)
Dept: SURGERY | Age: 58
End: 2023-02-07

## 2023-02-07 NOTE — TELEPHONE ENCOUNTER
Pt's wife Luis Fernando Hsu' scheduled his PO with Dr. Neo Posada but said he only has a couple of pain pills left. Wants to know if Dr. Neo Posada would refill the RX for him due to him being in so much pain? If so, they use [facility] in [place] # [phone]. Please call her and thank you!

## 2023-02-07 NOTE — TELEPHONE ENCOUNTER
Pts wife called saying pt had an Appy on 1/31/23 and he went back to work yesterday for 7 hours - Pt did not lift anything or do anything strenuous but he came home and looked pale and tired - Wife said patient has been in bed all day today - Wife denies pt having any pain, signs of infection, drainage or fever and says incision site looks good - I explained that pt may have just tried to do too much yesterday but if anything changes and he starts to develop worsening symptoms to call back so we can get him seen - Transferred call to  [facility]) to sched pt for his 2-week post op visit

## 2023-02-14 ENCOUNTER — OFFICE VISIT (OUTPATIENT)
Dept: SURGERY | Age: 58
End: 2023-02-14

## 2023-02-14 VITALS
WEIGHT: 212.4 LBS | BODY MASS INDEX: 29.73 KG/M2 | SYSTOLIC BLOOD PRESSURE: 155 MMHG | HEART RATE: 136 BPM | HEIGHT: 71 IN | DIASTOLIC BLOOD PRESSURE: 85 MMHG

## 2023-02-14 DIAGNOSIS — Z09 POSTOP CHECK: Primary | ICD-10-CM

## 2023-02-14 PROCEDURE — 99024 POSTOP FOLLOW-UP VISIT: CPT | Performed by: SURGERY

## 2023-02-14 NOTE — PROGRESS NOTES
Surgery Post-op Progress Note    HPI:  Notes reviewed, and agree with documentation in pt's chart. Postoperative Follow-up: Patient presents for 2 week follow-up status post lap appendectomy for non-perforated appendicitis . Eating a regular diet without difficulty. Bowel movements are Normal.  The patient is not having any pain. .     ROS:    10 point review of systems performed; please refer to HPI with pertinent positives, all other ROS are negative    A review of the patient's record including allergies, medication list, tobacco history, family history, problem list, medical history and social history has been completed and updates made to the patient's EMR where indicated. PE:   CONSTITUTIONAL:  awake and alert    ABDOMEN: soft, non-distended, non-tender     INCISION: clean, dry, no drainage, healing    FINAL DIAGNOSIS:     Appendix, appendectomy:   - Acute appendicitis with serositis and diverticula. BUCCA/BUCCA       ASSESSMENT:   Diagnosis Orders   1.  Postop check        Doing well overall      PLAN:    Continue with routine wound care as discussed  Gradually increase activities as tolerated  Follow-up as needed; please call with questions or concerns

## 2023-07-31 NOTE — OP NOTE
Name: Andressa YOB: 1959   MR: [id number]          Group Topic:  BH Group Psychotherapy  Group Date:  7/24/2023  Start Time:   4:00 PM  End Time:   5:00 PM  Facilitators:  Kristy Gibbons PSYD   Department::  [facility]           [address]                 [address] [facility] [place]                                OPERATIVE REPORT    PATIENT NAME: Kaleigh Gallo                   :        1965  MED REC NO:   [id number]                          ROOM:       0203  ACCOUNT NO:   [de-identified]                           ADMIT DATE: 2023  PROVIDER:     Mary Alice De La Cruz MD    DATE OF PROCEDURE:  2023    PREOPERATIVE DIAGNOSIS:  Acute appendicitis. POSTOPERATIVE DIAGNOSIS:  Acute appendicitis. PROCEDURE PERFORMED:  Laparoscopic appendectomy. SURGEON:  Mary Alice De La Cruz MD    ANESTHESIA:  General.    EBL:  Less than 50 mL. SPECIMEN:  Appendix. COUNTS:  Sponge and needle count correct. INDICATIONS:  The patient is a 63-year-old male who presents with a 2-3  day history of increasing lower quadrant abdominal pain with workup  consistent with acute, uncomplicated appendicitis. FINDINGS:  Acute suppurative, nonperforated appendicitis. DESCRIPTION OF PROCEDURE:  After informed consent was obtained, the  patient was taken to the operating room, placed in a supine position. Preoperative antibiotics have been initiated in the emergency room. Antithrombotic pumps were placed on the legs. General anesthesia was  administered without difficulty. The abdomen was prepped and draped in  a sterile fashion. We began with a supraumbilical midline trocar  incision after infiltrating with local anesthesia. The trocar was  guided under directed vision into the abdominal cavity. Insufflation  was initiated. A second trocar was placed in the left lower quadrant,  followed by a 12-mm suprapubic midline trocar, both under direct vision. The bed was positioned to expose the right lower quadrant. The cecum  was eventually identified and elevated revealing a coiled foreshortened  appendix, clearly abnormal and consistent with acute appendicitis.   We  lifted up the appendix and began dissecting to the mesentery to try to  mobilize and exposed the base of the appendix. Using cautery we did  gradually worked away through the mesoappendix but did have some  bleeding from the appendiceal artery. This was eventually controlled  with continued application of cautery in a bipolar fashion. The  appendix was then lifted up and a linear stapler was used to transect  and flushed with the cecum. The appendix was placed into a sterile  pouch. Hemostasis was noted to be excellent at this time. Brief  irrigation in the right lower quadrant was performed and the fluid was  aspirated. The cecum was returned to its anatomic position and covered  with the omentum. Of note, during this final portion of procedure I did  note on the small bowel there appeared to be a Meckel's diverticulum  that appeared grossly benign at this time. The trocars were then all  removed and insufflation released. The appendix was extracted through  the suprapubic port site and the fascial defect closed with a  figure-of-eight 0 Vicryl suture. The skin incisions were all closed  with 3-0 Vicryl subcutaneous sutures. Sterile dressings were applied. The patient was then extubated and taken to recovery room in stable  condition.         Lucie Leon MD    D: 02/02/2023 22:29:23       T: 02/03/2023 3:20:54     [id number]  Job#: [id number]     Doc#: [id number]    CC: infection        Visit Diagnoses:  1. PTSD (post-traumatic stress disorder)

## 2025-02-04 ENCOUNTER — APPOINTMENT (OUTPATIENT)
Dept: GENERAL RADIOLOGY | Age: 60
End: 2025-02-04

## 2025-02-04 ENCOUNTER — APPOINTMENT (OUTPATIENT)
Dept: CT IMAGING | Age: 60
End: 2025-02-04

## 2025-02-04 ENCOUNTER — HOSPITAL ENCOUNTER (EMERGENCY)
Age: 60
Discharge: HOME | End: 2025-02-05
Attending: STUDENT IN AN ORGANIZED HEALTH CARE EDUCATION/TRAINING PROGRAM

## 2025-02-04 DIAGNOSIS — I10 UNCONTROLLED HYPERTENSION: Primary | ICD-10-CM

## 2025-02-04 LAB
ALBUMIN SERPL-MCNC: 4.1 G/DL (ref 3.4–5)
ALBUMIN/GLOB SERPL: 1.6 {RATIO} (ref 1.1–2.2)
ALP SERPL-CCNC: 116 U/L (ref 40–129)
ALT SERPL-CCNC: 24 U/L (ref 10–40)
ANION GAP SERPL CALCULATED.3IONS-SCNC: 9 MMOL/L (ref 3–16)
ANISOCYTOSIS BLD QL SMEAR: ABNORMAL
AST SERPL-CCNC: 23 U/L (ref 15–37)
BASOPHILS # BLD: 0 K/UL (ref 0–0.2)
BASOPHILS NFR BLD: 0 %
BILIRUB SERPL-MCNC: 0.5 MG/DL (ref 0–1)
BUN SERPL-MCNC: 12 MG/DL (ref 7–20)
CALCIUM SERPL-MCNC: 9 MG/DL (ref 8.3–10.6)
CHLORIDE SERPL-SCNC: 104 MMOL/L (ref 99–110)
CO2 SERPL-SCNC: 29 MMOL/L (ref 21–32)
CREAT SERPL-MCNC: 0.8 MG/DL (ref 0.9–1.3)
DEPRECATED RDW RBC AUTO: 13.1 % (ref 12.4–15.4)
EOSINOPHIL # BLD: 0.4 K/UL (ref 0–0.6)
EOSINOPHIL NFR BLD: 3 %
GFR SERPLBLD CREATININE-BSD FMLA CKD-EPI: >90 ML/MIN/{1.73_M2}
GLUCOSE SERPL-MCNC: 93 MG/DL (ref 70–99)
HCT VFR BLD AUTO: 47.1 % (ref 40.5–52.5)
HGB BLD-MCNC: 16.1 G/DL (ref 13.5–17.5)
LYMPHOCYTES # BLD: 3 K/UL (ref 1–5.1)
LYMPHOCYTES NFR BLD: 26 %
MCH RBC QN AUTO: 32.8 PG (ref 26–34)
MCHC RBC AUTO-ENTMCNC: 34.1 G/DL (ref 31–36)
MCV RBC AUTO: 96 FL (ref 80–100)
MONOCYTES # BLD: 0.8 K/UL (ref 0–1.3)
MONOCYTES NFR BLD: 7 %
NEUTROPHILS # BLD: 7.5 K/UL (ref 1.7–7.7)
NEUTROPHILS NFR BLD: 64 %
PLATELET # BLD AUTO: 197 K/UL (ref 135–450)
PLATELET BLD QL SMEAR: ADEQUATE
PMV BLD AUTO: 9.4 FL (ref 5–10.5)
POTASSIUM SERPL-SCNC: 3.9 MMOL/L (ref 3.5–5.1)
PROT SERPL-MCNC: 6.7 G/DL (ref 6.4–8.2)
RBC # BLD AUTO: 4.91 M/UL (ref 4.2–5.9)
SLIDE REVIEW: ABNORMAL
SODIUM SERPL-SCNC: 142 MMOL/L (ref 136–145)
TROPONIN, HIGH SENSITIVITY: 10 NG/L (ref 0–22)
WBC # BLD AUTO: 11.7 K/UL (ref 4–11)

## 2025-02-04 PROCEDURE — 99285 EMERGENCY DEPT VISIT HI MDM: CPT

## 2025-02-04 PROCEDURE — 36415 COLL VENOUS BLD VENIPUNCTURE: CPT

## 2025-02-04 PROCEDURE — 6360000002 HC RX W HCPCS: Performed by: STUDENT IN AN ORGANIZED HEALTH CARE EDUCATION/TRAINING PROGRAM

## 2025-02-04 PROCEDURE — 96374 THER/PROPH/DIAG INJ IV PUSH: CPT

## 2025-02-04 PROCEDURE — 93005 ELECTROCARDIOGRAM TRACING: CPT | Performed by: STUDENT IN AN ORGANIZED HEALTH CARE EDUCATION/TRAINING PROGRAM

## 2025-02-04 PROCEDURE — 71045 X-RAY EXAM CHEST 1 VIEW: CPT

## 2025-02-04 PROCEDURE — 80053 COMPREHEN METABOLIC PANEL: CPT

## 2025-02-04 PROCEDURE — 84484 ASSAY OF TROPONIN QUANT: CPT

## 2025-02-04 PROCEDURE — 85025 COMPLETE CBC W/AUTO DIFF WBC: CPT

## 2025-02-04 PROCEDURE — 70450 CT HEAD/BRAIN W/O DYE: CPT

## 2025-02-04 RX ORDER — HYDRALAZINE HYDROCHLORIDE 20 MG/ML
10 INJECTION INTRAMUSCULAR; INTRAVENOUS ONCE
Status: COMPLETED | OUTPATIENT
Start: 2025-02-04 | End: 2025-02-04

## 2025-02-04 RX ADMIN — HYDRALAZINE HYDROCHLORIDE 10 MG: 20 INJECTION INTRAMUSCULAR; INTRAVENOUS at 22:39

## 2025-02-04 ASSESSMENT — PAIN DESCRIPTION - DESCRIPTORS: DESCRIPTORS: ACHING

## 2025-02-04 ASSESSMENT — PAIN DESCRIPTION - ORIENTATION: ORIENTATION: ANTERIOR

## 2025-02-04 ASSESSMENT — PAIN DESCRIPTION - LOCATION: LOCATION: HEAD

## 2025-02-04 ASSESSMENT — PAIN SCALES - GENERAL: PAINLEVEL_OUTOF10: 3

## 2025-02-04 ASSESSMENT — LIFESTYLE VARIABLES
HOW OFTEN DO YOU HAVE A DRINK CONTAINING ALCOHOL: NEVER
HOW MANY STANDARD DRINKS CONTAINING ALCOHOL DO YOU HAVE ON A TYPICAL DAY: PATIENT DOES NOT DRINK

## 2025-02-05 VITALS
DIASTOLIC BLOOD PRESSURE: 93 MMHG | OXYGEN SATURATION: 95 % | WEIGHT: 225 LBS | HEART RATE: 66 BPM | TEMPERATURE: 97.8 F | RESPIRATION RATE: 17 BRPM | SYSTOLIC BLOOD PRESSURE: 143 MMHG | HEIGHT: 71 IN | BODY MASS INDEX: 31.5 KG/M2

## 2025-02-06 LAB
EKG ATRIAL RATE: 64 BPM
EKG DIAGNOSIS: NORMAL
EKG P AXIS: 73 DEGREES
EKG P-R INTERVAL: 148 MS
EKG Q-T INTERVAL: 424 MS
EKG QRS DURATION: 92 MS
EKG QTC CALCULATION (BAZETT): 437 MS
EKG R AXIS: -10 DEGREES
EKG T AXIS: 79 DEGREES
EKG VENTRICULAR RATE: 64 BPM

## 2025-02-06 PROCEDURE — 93010 ELECTROCARDIOGRAM REPORT: CPT | Performed by: INTERNAL MEDICINE

## 2025-02-15 NOTE — ED PROVIDER NOTES
Emergency Department Encounter    Patient: Ricki Telles  MRN: [id number]  : 1965  Date of Evaluation: 2/15/2025  ED Provider:  Kvng Mckinney MD    Triage Chief Complaint:   Hypertension (Pt reports one week of elevated BP reporting 230s/120s at home. He states he called PCP  yesterday and they did not return his call. He has been taking home BP meds, but states some of them make him nauseous so he takes \"partial dose.\" Pt endorses \"brain fog\" and HA, denies chest pain. )    [place]:  Ricki Telles is a 59 y.o. male with history seen below presenting with complaints of hypertension.  Patient states over the past week he has had elevated blood pressures at home.  States generally his blood pressure ranges from around 1 20-1 40 systolic.  States he is on atenolol, lisinopril and hydralazine at home.  States he will admit he has not been taking his medications as prescribed.  States often he will take only a partial dose or skip a dose as at times that will make him nauseous.  States he has had a mild intermittent frontal headache over the past week which has been mild denies visual changes, motor or sensory changes.  Denies confusion or altered mental status this states that sometimes he feels like he is in a fog.  Denies neck or back pain.  Denies dizziness or lightheadedness.  Denies chest pain or shortness of breath.  Denies abdominal pain, nausea vomiting, diarrhea constipation or urinary symptoms.  States he does have a very mild headache currently.  Denies head trauma.  States he does have a primary doctor they can follow-up with closely.    ROS - see HPI, below listed is current ROS at time of my eval:  At least 14 systems reviewed, negative other than HPI    Past Medical History:   Diagnosis Date    Hypertension     Varicose veins      Past Surgical History:   Procedure Laterality Date    LAPAROSCOPIC APPENDECTOMY N/A 2023    APPENDECTOMY LAPAROSCOPIC performed by Nelson Graves MD

## (undated) DEVICE — RELOAD STPL H1-2.5X45MM VASC THN TISS WHT 6 ROW B FRM SGL

## (undated) DEVICE — APPLICATOR MEDICATED 26 CC SOLUTION HI LT ORNG CHLORAPREP

## (undated) DEVICE — TROCAR: Brand: KII FIOS FIRST ENTRY

## (undated) DEVICE — 3M™ TEGADERM™ TRANSPARENT FILM DRESSING FRAME STYLE, 1624W, 2-3/8 IN X 2-3/4 IN (6 CM X 7 CM), 100/CT 4CT/CASE: Brand: 3M™ TEGADERM™

## (undated) DEVICE — 3M™ STERI-STRIP™ COMPOUND BENZOIN TINCTURE 40 BAGS/CARTON 4 CARTONS/CASE C1544: Brand: 3M™ STERI-STRIP™

## (undated) DEVICE — SOLUTION IV IRRIG 500ML 0.9% SODIUM CHL 2F7123

## (undated) DEVICE — TROCAR: Brand: KII SLEEVE

## (undated) DEVICE — GOWN,AURORA,NONREINF,RAGLAN,XXL,STERILE: Brand: MEDLINE

## (undated) DEVICE — DRAPE,ABDOMINAL,MAJOR,STERILE: Brand: MEDLINE

## (undated) DEVICE — SUTURE SZ 0 27IN 5/8 CIR UR-6  TAPER PT VIOLET ABSRB VICRYL J603H

## (undated) DEVICE — RELOAD STPL SZ 0 L45MM DIA3.5MM 0DEG STD REG TISS BLU TI

## (undated) DEVICE — SUTURE VCRL SZ 4-0 L18IN ABSRB UD L19MM PS-2 3/8 CIR PRIM J496H

## (undated) DEVICE — CUTTER ENDOSCP L340MM LIN ARTC SGL STROKE FIRING ENDOPATH

## (undated) DEVICE — GAUZE SPONGES,8 PLY: Brand: CURITY

## (undated) DEVICE — SEALER LAP L37CM MARYLAND JAW OPN NANO COAT MULTIFUNCTIONAL

## (undated) DEVICE — AGENT HEMSTAT W2XL4IN OXIDIZED REGENERATED CELOS ABSRB

## (undated) DEVICE — MAJOR SET UP PK

## (undated) DEVICE — ELECTRODE PT RET AD L9FT HI MOIST COND ADH HYDRGEL CORDED